# Patient Record
Sex: FEMALE | Race: WHITE | NOT HISPANIC OR LATINO | Employment: FULL TIME | URBAN - METROPOLITAN AREA
[De-identification: names, ages, dates, MRNs, and addresses within clinical notes are randomized per-mention and may not be internally consistent; named-entity substitution may affect disease eponyms.]

---

## 2017-02-28 ENCOUNTER — HOSPITAL ENCOUNTER (EMERGENCY)
Facility: CLINIC | Age: 22
Discharge: HOME | End: 2017-02-28
Attending: EMERGENCY MEDICINE | Admitting: EMERGENCY MEDICINE
Payer: COMMERCIAL

## 2017-02-28 VITALS
OXYGEN SATURATION: 98 % | HEIGHT: 64 IN | RESPIRATION RATE: 18 BRPM | SYSTOLIC BLOOD PRESSURE: 121 MMHG | TEMPERATURE: 98.1 F | HEART RATE: 71 BPM | DIASTOLIC BLOOD PRESSURE: 77 MMHG

## 2017-02-28 DIAGNOSIS — S46.812A STRAIN OF TRAPEZIUS MUSCLE, LEFT, INITIAL ENCOUNTER: ICD-10-CM
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
## 2017-02-28 PROCEDURE — 99283 EMERGENCY DEPT VISIT LOW MDM: CPT | Mod: Z6 | Performed by: EMERGENCY MEDICINE

## 2017-02-28 PROCEDURE — 99283 EMERGENCY DEPT VISIT LOW MDM: CPT | Performed by: EMERGENCY MEDICINE

## 2017-02-28 PROCEDURE — 25000132 ZZH RX MED GY IP 250 OP 250 PS 637: Performed by: EMERGENCY MEDICINE

## 2017-02-28 RX ORDER — DESOGESTREL AND ETHINYL ESTRADIOL 0.15-0.03
1 KIT ORAL DAILY
COMMUNITY
End: 2023-08-06

## 2017-02-28 RX ORDER — ACETAMINOPHEN 325 MG/1
975 TABLET ORAL EVERY 4 HOURS PRN
Status: DISCONTINUED | OUTPATIENT
Start: 2017-02-28 | End: 2017-02-28 | Stop reason: HOSPADM

## 2017-02-28 RX ORDER — NAPROXEN 500 MG/1
500 TABLET ORAL 2 TIMES DAILY WITH MEALS
Qty: 14 TABLET | Refills: 0 | Status: SHIPPED | OUTPATIENT
Start: 2017-02-28 | End: 2017-03-07

## 2017-02-28 RX ORDER — METHOCARBAMOL 500 MG/1
1000 TABLET, FILM COATED ORAL 3 TIMES DAILY
Qty: 30 TABLET | Refills: 0 | Status: SHIPPED | OUTPATIENT
Start: 2017-02-28 | End: 2017-03-05

## 2017-02-28 RX ADMIN — ACETAMINOPHEN 975 MG: 325 TABLET, FILM COATED ORAL at 19:44

## 2017-02-28 NOTE — ED AVS SNAPSHOT
[facility], Emergency Department    [address] [address]    Phone:  [phone]                                       Anastasia Yo   MRN: [id number]    Department:  [facility], Emergency Department   Date of Visit:  2/28/2017           Patient Information     Date Of Birth          1995        Your diagnoses for this visit were:     Strain of trapezius muscle, left, initial encounter        You were seen by Johan Gee MD.        Discharge Instructions       Wear sling for 2 days and then start moving left arm around 3 times daily to increase range of motion.    May use heat for comfort    Please make an appointment to follow up with [facility] (phone: [phone]) or our Sports Medicine Clinic (phone: [phone]) in 6-7 days unless symptoms completely resolve.    Discharge References/Attachments     MUSCLE STRAIN, EXTREMITY (ENGLISH)      24 Hour Appointment Hotline       To make an appointment at any [facility] clinic, call [phone] ([phone]). If you don't have a family doctor or clinic, we will help you find one. [facility] clinics are conveniently located to serve the needs of you and your family.          ED Discharge Orders     ARM SLING L                    Review of your medicines      START taking        Dose / Directions Last dose taken    methocarbamol 500 MG tablet   Commonly known as:  ROBAXIN   Dose:  1000 mg   Quantity:  30 tablet        Take 2 tablets (1,000 mg) by mouth 3 times daily for 5 days   Refills:  0        naproxen 500 MG tablet   Commonly known as:  NAPROSYN   Dose:  500 mg   Quantity:  14 tablet        Take 1 tablet (500 mg) by mouth 2 times daily (with meals) for 7 days   Refills:  0          Our records show that you are taking the medicines listed below. If these are incorrect, please call your family doctor or clinic.        Dose / Directions Last dose taken    desogestrel-ethinyl estradiol 0.15-30 MG-MCG per  "tablet   Commonly known as:  APRI   Dose:  1 tablet        Take 1 tablet by mouth daily   Refills:  0        SERTRALINE HCL PO   Dose:  100 mg        Take 100 mg by mouth daily   Refills:  0        TRAZODONE HCL PO   Dose:  50 mg        Take 50 mg by mouth nightly as needed for sleep   Refills:  0                Prescriptions were sent or printed at these locations (2 Prescriptions)                   Other Prescriptions                Printed at Department/Unit printer (2 of 2)         naproxen (NAPROSYN) 500 MG tablet               methocarbamol (ROBAXIN) 500 MG tablet                Orders Needing Specimen Collection     None      Pending Results     No orders found from 2017 to 3/1/2017.            Pending Culture Results     No orders found from 2017 to 3/1/2017.            Thank you for choosing [facility]       Thank you for choosing [facility] for your care. Our goal is always to provide you with excellent care. Hearing back from our patients is one way we can continue to improve our services. Please take a few minutes to complete the written survey that you may receive in the mail after you visit with us. Thank you!        [employer] Information     [employer] lets you send messages to your doctor, view your test results, renew your prescriptions, schedule appointments and more. To sign up, go to [email or web address] . Click on \"Log in\" on the left side of the screen, which will take you to the Welcome page. Then click on \"Sign up Now\" on the right side of the page.     You will be asked to enter the access code listed below, as well as some personal information. Please follow the directions to create your username and password.     Your access code is: [email or web address]  Expires: 2017  8:26 PM     Your access code will  in 90 days. If you need help or a new code, please call your [facility] clinic or [phone].        Care EveryWhere ID     This is your Care EveryWhere ID. This could be used by " other organizations to access your [facility] medical records  [id number]        After Visit Summary       This is your record. Keep this with you and show to your community pharmacist(s) and doctor(s) at your next visit.

## 2017-02-28 NOTE — ED AVS SNAPSHOT
[facility], Emergency Department    [address] [place] [address]    Phone:  [phone]                                       Anastasia Yo   MRN: [id number]    Department:  [facility], Emergency Department   Date of Visit:  2/28/2017           After Visit Summary Signature Page     I have received my discharge instructions, and my questions have been answered. I have discussed any challenges I see with this plan with the nurse or doctor.    ..........................................................................................................................................  Patient/Patient Representative Signature      ..........................................................................................................................................  Patient Representative Print Name and Relationship to Patient    ..................................................               ................................................  Date                                            Time    ..........................................................................................................................................  Reviewed by Signature/Title    ...................................................              ..............................................  Date                                                            Time

## 2017-03-01 ENCOUNTER — OFFICE VISIT (OUTPATIENT)
Dept: ORTHOPEDICS | Facility: CLINIC | Age: 22
End: 2017-03-01

## 2017-03-01 VITALS — BODY MASS INDEX: 24.75 KG/M2 | WEIGHT: 145 LBS | HEIGHT: 64 IN

## 2017-03-01 DIAGNOSIS — M79.18 MYOFASCIAL PAIN ON LEFT SIDE: Primary | ICD-10-CM

## 2017-03-01 NOTE — ED PROVIDER NOTES
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  Patient states she was exercising this evening when she felt a pop in her left shoulder blade area.  She c/o pain in the mid left trapezius muscle.  She denies previous orthopedic injury.    I have reviewed the Medications, Allergies, Past Medical and Surgical History, and Social History in the [employer] system.  Past Medical History   Diagnosis Date     Anxiety      No past surgical history on file.    Previous Medications    DESOGESTREL-ETHINYL ESTRADIOL (APRI) 0.15-30 MG-MCG PER TABLET    Take 1 tablet by mouth daily    SERTRALINE HCL PO    Take 100 mg by mouth daily    TRAZODONE HCL PO    Take 50 mg by mouth nightly as needed for sleep      Social History     Social History     Marital status: Single     Spouse name: N/A     Number of children: N/A     Years of education: N/A     Occupational History     Not on file.     Social History Main Topics     Smoking status: Not on file     Smokeless tobacco: Not on file     Alcohol use Not on file     Drug use: Not on file     Sexual activity: Not on file     Other Topics Concern     Not on file     Social History Narrative     No narrative on file        Allergies   Allergen Reactions     No Clinical Screening - See Comments      Suparax       Review of Systems    ROS: 10 point ROS neg other than the symptoms noted above in the HPI.    Physical Exam   BP: 121/77  Pulse: 71  Temp: 98.1  F (36.7  C)  Resp: 18  Height: 162.6 cm (5' 4\")  SpO2: 98 %  Physical Exam   Constitutional: She is oriented to person, place, and time. No distress.   HENT:   Head: Atraumatic.   Eyes: EOM are normal. Pupils are equal, round, and reactive to light.   Neck: Neck supple.   nontender in the midline   Musculoskeletal:   Patient's left " shoulder has no bony tenderness.  The glenohumeral joint has good range of motion without tenderness.  There is no clavicle tenderness or AC tenderness.  Tenderness exists in the trapezius muscle without midline spinal tenderness.    Left upper extremity distal CMS intact.   Neurological: She is alert and oriented to person, place, and time.   grossly intact and symmetric   Skin: Skin is warm. She is not diaphoretic.   Psychiatric: She has a normal mood and affect.       ED Course     ED Course     Procedures            Assessments & Plan (with Medical Decision Making)     I have reviewed the nursing notes.    At this time we will treat the patient conservatively with a sling and medications including those below.  X-ray deferred to outpatient follow-up.  Patient in agreement with this.    I have reviewed the findings, diagnosis, plan and need for follow up with the patient.    New Prescriptions    METHOCARBAMOL (ROBAXIN) 500 MG TABLET    Take 2 tablets (1,000 mg) by mouth 3 times daily for 5 days    NAPROXEN (NAPROSYN) 500 MG TABLET    Take 1 tablet (500 mg) by mouth 2 times daily (with meals) for 7 days       Final diagnoses:   Strain of trapezius muscle, left, initial encounter     Wear sling for 2 days and then start moving left arm around 3 times daily to increase range of motion.    May use heat for comfort    Please make an appointment to follow up with [facility] (phone: [phone]) or our Sports Medicine Clinic (phone: [phone]) in 6-7 days unless symptoms completely resolve.    2/28/2017   [facility], EMERGENCY DEPARTMENT     Johan Gee MD  02/28/17 2033

## 2017-03-01 NOTE — LETTER
"  3/1/2017      RE: Anastasia Yo  [id number] [facility] [address]        Subjective:   Anastasia Yo is a 21 year old female who complains of left shoulder pain. She states that for 2-3 weeks her left posterior shoulder region and musculature has felt sore. She saw her chiropractor but didn't feel any relief. It has gotten worse over the last 2 days. Last night she was doing low impact physical activity when she heard an audible pop in the shoulder and felt excruciating pain. She presented to the ED and was evaluated and given a sling. She is here today for follow up. No worsening of her symptoms. She denies any history of shoulder trauma or shoulder injury in the past. Denies history of whiplash. Denies any neck injury or radicular symptoms. She denies any concurrent illness, URI, new medications or change in activity.    Background:   Date of injury: None   Duration of symptoms: 3 weeks  Mechanism of Injury: Insidious Onset; Unknown   Aggravating factors: Laying down, rolling over in bed, movement of the right arm, heavy lifting  Relieving Factors: Robaxin & Naprosyn   Prior Evaluation: Prior Physician Evalutation: ED    PAST MEDICAL, SOCIAL, SURGICAL AND FAMILY HISTORY: She  has a past medical history of Anxiety.  She  has no past surgical history on file.  Her family history is not on file.  She     ALLERGIES: She is allergic to no clinical screening - see comments.    CURRENT MEDICATIONS: She has a current medication list which includes the following prescription(s): sertraline hcl, trazodone hcl, desogestrel-ethinyl estradiol, naproxen, and methocarbamol.     REVIEW OF SYSTEMS: 12 point review of systems is negative except as noted above.     Exam:   Ht 5' 4\" (1.626 m)  Wt 145 lb (65.8 kg)  BMI 24.89 kg/m2      CONSTITUTIONIAL: healthy, alert and no distress  HEAD: Normocephalic. No masses, lesions, tenderness or abnormalities  SKIN: no suspicious lesions or rashes  GAIT: normal  NEUROLOGIC: Non-focal, " Normal muscle tone and strength, reflexes normal, sensation grossly normal.  PSYCHIATRIC: affect normal/bright and mentation appears normal.    MUSCULOSKELETAL:   CERVICAL SPINE: nontender over the cervical spine; ROM of demonstrates full flexion, full extension, full extension rotation to the right, full extension rotation to the left, (Negative) Spurling's maneuver; motor examination demonstrates symmetric motor strength (5/5) bilaterally. Sensation to light touch is intact in the bilateral upper extremities. DTRs trace and symmetric in the bilateral upper extremities.    LEFT SHOULDER: Clavicle and AC joint nontender. FROM. Negative impingement signs. MMT of RC muscles intact and nonpainful. No laxity.    SHOULDER GIRDLE: +trigger points in the left mid trapezius and rhomboid. No scapular winging or snapping. Scapular bursa is nontender.         Assessment/Plan:   (M79.1) Myofascial pain on left side  (primary encounter diagnosis)  Comment: Reassured. May come out of sling. Discussed  mg po tid x 1 week. Consider PT if needed if not resolving quickly.  Superficial moist heat can be used. All questions answered.    Thank you for allowing me to participate in her care. Please feel free to contact me if I can provide any further information.             Bill Fofana MD

## 2017-03-01 NOTE — DISCHARGE INSTRUCTIONS
Wear sling for 2 days and then start moving left arm around 3 times daily to increase range of motion.    May use heat for comfort    Please make an appointment to follow up with [facility] (phone: [phone]) or our Sports Medicine Clinic (phone: [phone]) in 6-7 days unless symptoms completely resolve.

## 2017-03-01 NOTE — LETTER
Date:March 2, 2017      Patient was self referred, no letter generated. Do not send.        [facility] Health Information

## 2017-03-01 NOTE — ED NOTES
Left shoulder pain x3 weeks. States she does a lot of computer use and hunching over reading books  This evening she was exercising and heard a pop, and pain worsened, shooting down her arm   Denies numbness or tingling  Using aleve and hot packs with relief

## 2017-03-01 NOTE — PROGRESS NOTES
" Subjective:   Anastasia Yo is a 21 year old female who complains of left shoulder pain. She states that for 2-3 weeks her left posterior shoulder region and musculature has felt sore. She saw her chiropractor but didn't feel any relief. It has gotten worse over the last 2 days. Last night she was doing low impact physical activity when she heard an audible pop in the shoulder and felt excruciating pain. She presented to the ED and was evaluated and given a sling. She is here today for follow up. No worsening of her symptoms. She denies any history of shoulder trauma or shoulder injury in the past. Denies history of whiplash. Denies any neck injury or radicular symptoms. She denies any concurrent illness, URI, new medications or change in activity.    Background:   Date of injury: None   Duration of symptoms: 3 weeks  Mechanism of Injury: Insidious Onset; Unknown   Aggravating factors: Laying down, rolling over in bed, movement of the right arm, heavy lifting  Relieving Factors: Robaxin & Naprosyn   Prior Evaluation: Prior Physician Evalutation: ED    PAST MEDICAL, SOCIAL, SURGICAL AND FAMILY HISTORY: She  has a past medical history of Anxiety.  She  has no past surgical history on file.  Her family history is not on file.  She     ALLERGIES: She is allergic to no clinical screening - see comments.    CURRENT MEDICATIONS: She has a current medication list which includes the following prescription(s): sertraline hcl, trazodone hcl, desogestrel-ethinyl estradiol, naproxen, and methocarbamol.     REVIEW OF SYSTEMS: 12 point review of systems is negative except as noted above.     Exam:   Ht 5' 4\" (1.626 m)  Wt 145 lb (65.8 kg)  BMI 24.89 kg/m2      CONSTITUTIONIAL: healthy, alert and no distress  HEAD: Normocephalic. No masses, lesions, tenderness or abnormalities  SKIN: no suspicious lesions or rashes  GAIT: normal  NEUROLOGIC: Non-focal, Normal muscle tone and strength, reflexes normal, sensation grossly " normal.  PSYCHIATRIC: affect normal/bright and mentation appears normal.    MUSCULOSKELETAL:   CERVICAL SPINE: nontender over the cervical spine; ROM of demonstrates full flexion, full extension, full extension rotation to the right, full extension rotation to the left, (Negative) Spurling's maneuver; motor examination demonstrates symmetric motor strength (5/5) bilaterally. Sensation to light touch is intact in the bilateral upper extremities. DTRs trace and symmetric in the bilateral upper extremities.    LEFT SHOULDER: Clavicle and AC joint nontender. FROM. Negative impingement signs. MMT of RC muscles intact and nonpainful. No laxity.    SHOULDER GIRDLE: +trigger points in the left mid trapezius and rhomboid. No scapular winging or snapping. Scapular bursa is nontender.         Assessment/Plan:   (M79.1) Myofascial pain on left side  (primary encounter diagnosis)  Comment: Reassured. May come out of sling. Discussed  mg po tid x 1 week. Consider PT if needed if not resolving quickly.  Superficial moist heat can be used. All questions answered.    Thank you for allowing me to participate in her care. Please feel free to contact me if I can provide any further information.

## 2017-03-01 NOTE — MR AVS SNAPSHOT
"              After Visit Summary   3/1/2017    Anastasia Yo    MRN: [id number]           Patient Information     Date Of Birth          1995        Visit Information        Provider Department      3/1/2017 1:30 PM Bill Fofana MD [facility]        Today's Diagnoses     Myofascial pain on left side    -  1       Follow-ups after your visit        Who to contact     Please call your clinic at [phone] to:    Ask questions about your health    Make or cancel appointments    Discuss your medicines    Learn about your test results    Speak to your doctor   If you have compliments or concerns about an experience at your clinic, or if you wish to file a complaint, please contact [facility] Patient Relations at [phone] or email us at [email or web address]         Additional Information About Your Visit        MyChart Information     [employer] is an electronic gateway that provides easy, online access to your medical records. With [employer], you can request a clinic appointment, read your test results, renew a prescription or communicate with your care team.     To sign up for [employer] visit the website at [email or web address]   You will be asked to enter the access code listed below, as well as some personal information. Please follow the directions to create your username and password.     Your access code is: [email or web address]  Expires: 2017  8:26 PM     Your access code will  in 90 days. If you need help or a new code, please contact your [facility] or call [phone] for assistance.        Care EveryWhere ID     This is your Care EveryWhere ID. This could be used by other organizations to access your [facility] medical records  [id number]        Your Vitals Were     Height BMI (Body Mass Index)                5' 4\" (1.626 m) 24.89 kg/m2           Blood Pressure from Last 3 Encounters:   17 121/77 "   11/13/16 93/59    Weight from Last 3 Encounters:   03/01/17 145 lb (65.8 kg) (77 %)*     * Growth percentiles are based on CDC 2-20 Years data.              Today, you had the following     No orders found for display       Primary Care Provider    [facility] Other Clinic                Thank you!     Thank you for choosing [facility]  for your care. Our goal is always to provide you with excellent care. Hearing back from our patients is one way we can continue to improve our services. Please take a few minutes to complete the written survey that you may receive in the mail after your visit with us. Thank you!             Your Updated Medication List - Protect others around you: Learn how to safely use, store and throw away your medicines at www.disposemymeds.org.          This list is accurate as of: 3/1/17  2:51 PM.  Always use your most recent med list.                   Brand Name Dispense Instructions for use    desogestrel-ethinyl estradiol 0.15-30 MG-MCG per tablet    APRI     Take 1 tablet by mouth daily       methocarbamol 500 MG tablet    ROBAXIN    30 tablet    Take 2 tablets (1,000 mg) by mouth 3 times daily for 5 days       naproxen 500 MG tablet    NAPROSYN    14 tablet    Take 1 tablet (500 mg) by mouth 2 times daily (with meals) for 7 days       SERTRALINE HCL PO      Take 100 mg by mouth daily       TRAZODONE HCL PO      Take 50 mg by mouth nightly as needed for sleep

## 2019-09-10 ENCOUNTER — HOSPITAL ENCOUNTER (EMERGENCY)
Facility: CLINIC | Age: 24
Discharge: HOME | End: 2019-09-10
Attending: EMERGENCY MEDICINE | Admitting: EMERGENCY MEDICINE
Payer: COMMERCIAL

## 2019-09-10 VITALS
SYSTOLIC BLOOD PRESSURE: 135 MMHG | WEIGHT: 198.8 LBS | HEIGHT: 64 IN | TEMPERATURE: 99 F | OXYGEN SATURATION: 97 % | BODY MASS INDEX: 33.94 KG/M2 | RESPIRATION RATE: 12 BRPM | DIASTOLIC BLOOD PRESSURE: 58 MMHG | HEART RATE: 18 BPM

## 2019-09-10 DIAGNOSIS — J02.9 ACUTE SORE THROAT: ICD-10-CM

## 2019-09-10 LAB
DEPRECATED S PYO AG THROAT QL EIA: NORMAL
HETEROPH AB SER QL: NEGATIVE
SPECIMEN SOURCE: NORMAL

## 2019-09-10 PROCEDURE — 87081 CULTURE SCREEN ONLY: CPT | Performed by: EMERGENCY MEDICINE

## 2019-09-10 PROCEDURE — 99284 EMERGENCY DEPT VISIT MOD MDM: CPT | Mod: Z6 | Performed by: EMERGENCY MEDICINE

## 2019-09-10 PROCEDURE — 25000131 ZZH RX MED GY IP 250 OP 636 PS 637: Performed by: EMERGENCY MEDICINE

## 2019-09-10 PROCEDURE — 86308 HETEROPHILE ANTIBODY SCREEN: CPT | Performed by: EMERGENCY MEDICINE

## 2019-09-10 PROCEDURE — 87880 STREP A ASSAY W/OPTIC: CPT | Performed by: EMERGENCY MEDICINE

## 2019-09-10 PROCEDURE — 99283 EMERGENCY DEPT VISIT LOW MDM: CPT | Performed by: EMERGENCY MEDICINE

## 2019-09-10 PROCEDURE — 25000132 ZZH RX MED GY IP 250 OP 250 PS 637: Performed by: EMERGENCY MEDICINE

## 2019-09-10 RX ORDER — DEXAMETHASONE 4 MG/1
4 TABLET ORAL ONCE
Status: COMPLETED | OUTPATIENT
Start: 2019-09-10 | End: 2019-09-10

## 2019-09-10 RX ORDER — IBUPROFEN 600 MG/1
600 TABLET, FILM COATED ORAL ONCE
Status: COMPLETED | OUTPATIENT
Start: 2019-09-10 | End: 2019-09-10

## 2019-09-10 RX ADMIN — DEXAMETHASONE 4 MG: 4 TABLET ORAL at 18:47

## 2019-09-10 RX ADMIN — IBUPROFEN 600 MG: 600 TABLET ORAL at 18:47

## 2019-09-10 ASSESSMENT — ENCOUNTER SYMPTOMS
HEADACHES: 0
SORE THROAT: 1
FEVER: 0
SHORTNESS OF BREATH: 0
NAUSEA: 0

## 2019-09-10 ASSESSMENT — MIFFLIN-ST. JEOR: SCORE: 1651.75

## 2019-09-10 NOTE — ED TRIAGE NOTES
Pt has been treated for strep 2 1/2 weeks ago. Pt reports her throat is a lot more tender today with lymph node swelling.

## 2019-09-10 NOTE — ED PROVIDER NOTES
"  History     Chief Complaint   Patient presents with     Oral Swelling     Sore throat/swelling/strep 2 1/2 weeks ago     HPI  Anastasia Yo is a 23 year old female who presents with a sore throat.  She was diagnosed with strep 2 weeks ago and finished her amoxicillin 1 week ago.  She is feeling better however yesterday developed worsening throat pain.  She looked in her mouth today and thought she saw exudates on her tonsils.  She denies fever and has a mild cough.  She denies any dental infections, broken teeth, dental caries.  She has no difficulty swallowing or problem managing her secretions, just has some pain with swallowing.  She has not taken anything at home for her symptoms aside from some Chloraseptic Spray.  Her sister is also ill.    I have reviewed the Medications, Allergies, Past Medical and Surgical History, and Social History in the [employer] system.    Review of Systems   Constitutional: Negative for fever.   HENT: Positive for sore throat. Negative for congestion.    Respiratory: Negative for shortness of breath.    Cardiovascular: Negative for chest pain.   Gastrointestinal: Negative for nausea.   Skin: Negative for rash.   Neurological: Negative for headaches.       Physical Exam   BP: 135/58  Heart Rate: 93  Temp: 99  F (37.2  C)  Resp: 12  Height: 162.6 cm (5' 4\")  Weight: 90.2 kg (198 lb 12.8 oz)  SpO2: 96 %      Physical Exam   Constitutional: She is oriented to person, place, and time. She appears well-developed and well-nourished. No distress.   HENT:   Head: Normocephalic and atraumatic.   Mild erythema of posterior oropharynx.  No exudates on tonsils.  Good dentition.   Eyes: Conjunctivae are normal.   Neck: Normal range of motion.   Cardiovascular: Normal rate and regular rhythm.   Pulmonary/Chest: Effort normal and breath sounds normal.   Abdominal: Soft. There is no tenderness.   Musculoskeletal: Normal range of motion.   Neurological: She is alert and oriented to person, place, and time. "   Skin: Skin is warm and dry. No rash noted.       ED Course        Procedures             Critical Care time:  none             Labs Ordered and Resulted from Time of ED Arrival Up to the Time of Departure from the ED - No data to display         Assessments & Plan (with Medical Decision Making)   Ms. Yo is a 23-year-old woman with no significant past medical history who presents with throat pain since yesterday.  She is recently treated for strep.  Strep swab ordered in triage was negative.  Symptoms most consistent with viral pharyngitis however due to recurrent nature of symptoms I sent a Monospot.  She was treated with small dose of Decadron and ibuprofen.  Supportive care recommendations were given as well as instructions for return for new or worsening symptoms.  Patient expressed understanding.    Anastasia Dean MD on 9/10/2019 at 10:55 PM     I have reviewed the nursing notes.    I have reviewed the findings, diagnosis, plan and need for follow up with the patient.    New Prescriptions    No medications on file       Final diagnoses:   None       9/10/2019   [facility], [place], EMERGENCY DEPARTMENT     Anastasia Dean MD  09/10/19 0934

## 2019-09-10 NOTE — DISCHARGE INSTRUCTIONS
Tylenol 650 mg every 6 hours as needed for pain.  Do not take more than 3250 mg per day   Use warm tea and honey, salt water gargles, topical anesthetics like chloraseptic for pain  Follow up on mono screen tomorrow.  If it is positive you should follow up with your primary doctor in 5-7 days and avoid contact sports.  Return to the emergency department for worsening pain, vomiting, difficulty breathing, drooling/inability to swallow, or if you have any new or changing symptoms/concerns.

## 2019-09-10 NOTE — ED AVS SNAPSHOT
[facility], [place], Emergency Department  [address] [place] [id number]  Phone:  [phone]                                    Anastasia Yo   MRN: [id number]    Department:  [facility], Emergency Department   Date of Visit:  9/10/2019           After Visit Summary Signature Page    I have received my discharge instructions, and my questions have been answered. I have discussed any challenges I see with this plan with the nurse or doctor.    ..........................................................................................................................................  Patient/Patient Representative Signature      ..........................................................................................................................................  Patient Representative Print Name and Relationship to Patient    ..................................................               ................................................  Date                                   Time    ..........................................................................................................................................  Reviewed by Signature/Title    ...................................................              ..............................................  Date                                               Time          22EPIC Rev 08/18

## 2019-09-11 NOTE — RESULT ENCOUNTER NOTE
Preliminary Beta strep group A r/o culture is PENDING and/or NEGATIVE at this time.   No changes in treatment per [place] Strep protocol.

## 2019-09-12 LAB
BACTERIA SPEC CULT: NORMAL
SPECIMEN SOURCE: NORMAL

## 2019-09-12 NOTE — RESULT ENCOUNTER NOTE
Final Beta strep group A r/o culture is NEGATIVE for Group A streptococcus.    No treatment or change in treatment per [place] Strep protocol.

## 2019-10-16 ENCOUNTER — TELEPHONE (OUTPATIENT)
Dept: PSYCHIATRY | Facility: CLINIC | Age: 24
End: 2019-10-16

## 2019-10-16 NOTE — TELEPHONE ENCOUNTER
PSYCHIATRY CLINIC PHONE INTAKE     SERVICES REQUESTED / INTERESTED IN          Med Management    Presenting Problem and Brief History                              What would you like to be seen for? (brief description):  Pt was recommended by her therapist to see a psychiatrist. She's taken medications in the past, and is currently taking buproprion 150mg and sertraline 100mg. She's noticed that her depression has gotten more severe over the last few months. She's fatigued and has a lost of interest in things she enjoyed. She also has thoughts of hopelessness and low self-image. Sleep is getting better, but there have been a couple of nights where she won't go to bed, or go to bed super late with maybe 2 hours of sleep. There are times when she's having racing thoughts that keep her awake, and other times when she wants to go to sleep, but worried that she will miss something if she does. She's been having bed-wetting problems and she went to the doctor about the issue, but she's concerned it may be caused by her mental health. The bed-wetting has been going on for over a year now. She also has history of eating disorder.    Have you received a mental health diagnosis? No   Which one (s): NA  Is there any history of developmental delay?  No   Are you currently seeing a mental health provider?  Yes            Who / month last seen:  Mary Menezes, therapist at the [facility].   Do you have mental health records elsewhere?  Yes  Will you sign a release so we can obtain them?  Yes    Have you ever been hospitalized for psychiatric reasons?  No  Describe:  NA    Do you have current thoughts of self-harm?  No    Do you currently have thoughts of harming others?  No       Substance Use History     Do you have any history of alcohol / illicit drug use?  No  Describe: However, her mother's side of the family has history of alcoholism and she has had issues with alcohol in the past.   Have you ever received treatment  for this?  No    Describe:  NA     Social History     Does the patient have a guardian?  No    Name / number: NA  Have you had an ACT team in last 12 months?  No  Describe: NA   Do you have any current or past legal issues?  No  Describe: NA   OK to leave a detailed voicemail?  Yes    Medical/ Surgical History                                 There is no problem list on file for this patient.         Medications             Current Outpatient Medications   Medication Sig Dispense Refill     desogestrel-ethinyl estradiol (APRI) 0.15-30 MG-MCG per tablet Take 1 tablet by mouth daily       SERTRALINE HCL PO Take 100 mg by mouth daily       TRAZODONE HCL PO Take 50 mg by mouth nightly as needed for sleep           DISPOSITION      10/16/19 Intake completed. Adding to resident/NP [facility].  Mckenzie Rodgers,

## 2021-04-13 ENCOUNTER — HOSPITAL ENCOUNTER (EMERGENCY)
Facility: CLINIC | Age: 26
Discharge: HOME | End: 2021-04-13
Attending: EMERGENCY MEDICINE | Admitting: EMERGENCY MEDICINE
Payer: COMMERCIAL

## 2021-04-13 VITALS
SYSTOLIC BLOOD PRESSURE: 134 MMHG | TEMPERATURE: 97.1 F | DIASTOLIC BLOOD PRESSURE: 85 MMHG | BODY MASS INDEX: 29.18 KG/M2 | OXYGEN SATURATION: 99 % | HEART RATE: 80 BPM | RESPIRATION RATE: 16 BRPM | WEIGHT: 170 LBS

## 2021-04-13 DIAGNOSIS — T74.21XA SEXUAL ASSAULT OF ADULT, INITIAL ENCOUNTER: ICD-10-CM

## 2021-04-13 LAB
ALBUMIN UR-MCNC: NEGATIVE MG/DL
APPEARANCE UR: CLEAR
BACTERIA #/AREA URNS HPF: ABNORMAL /HPF
BILIRUB UR QL STRIP: NEGATIVE
COLOR UR AUTO: ABNORMAL
GLUCOSE UR STRIP-MCNC: NEGATIVE MG/DL
HCG UR QL: NEGATIVE
HGB UR QL STRIP: NEGATIVE
KETONES UR STRIP-MCNC: NEGATIVE MG/DL
LEUKOCYTE ESTERASE UR QL STRIP: NEGATIVE
NITRATE UR QL: NEGATIVE
PH UR STRIP: 6.5 PH (ref 5–7)
RBC #/AREA URNS AUTO: <1 /HPF (ref 0–2)
SOURCE: ABNORMAL
SP GR UR STRIP: 1.02 (ref 1–1.03)
SPECIMEN SOURCE: NORMAL
SQUAMOUS #/AREA URNS AUTO: <1 /HPF (ref 0–1)
UROBILINOGEN UR STRIP-MCNC: NORMAL MG/DL (ref 0–2)
WBC #/AREA URNS AUTO: <1 /HPF (ref 0–5)
WET PREP SPEC: NORMAL

## 2021-04-13 PROCEDURE — 87491 CHLMYD TRACH DNA AMP PROBE: CPT | Performed by: EMERGENCY MEDICINE

## 2021-04-13 PROCEDURE — 87591 N.GONORRHOEAE DNA AMP PROB: CPT | Performed by: EMERGENCY MEDICINE

## 2021-04-13 PROCEDURE — 87210 SMEAR WET MOUNT SALINE/INK: CPT | Performed by: EMERGENCY MEDICINE

## 2021-04-13 PROCEDURE — 81001 URINALYSIS AUTO W/SCOPE: CPT | Performed by: EMERGENCY MEDICINE

## 2021-04-13 PROCEDURE — 96372 THER/PROPH/DIAG INJ SC/IM: CPT | Performed by: EMERGENCY MEDICINE

## 2021-04-13 PROCEDURE — 99284 EMERGENCY DEPT VISIT MOD MDM: CPT

## 2021-04-13 PROCEDURE — 99284 EMERGENCY DEPT VISIT MOD MDM: CPT | Performed by: EMERGENCY MEDICINE

## 2021-04-13 PROCEDURE — 250N000011 HC RX IP 250 OP 636: Performed by: EMERGENCY MEDICINE

## 2021-04-13 PROCEDURE — 250N000013 HC RX MED GY IP 250 OP 250 PS 637: Performed by: EMERGENCY MEDICINE

## 2021-04-13 PROCEDURE — 81025 URINE PREGNANCY TEST: CPT | Performed by: EMERGENCY MEDICINE

## 2021-04-13 PROCEDURE — 250N000009 HC RX 250: Performed by: EMERGENCY MEDICINE

## 2021-04-13 RX ORDER — CEFTRIAXONE SODIUM 1 G
500 VIAL (EA) INJECTION ONCE
Status: DISCONTINUED | OUTPATIENT
Start: 2021-04-13 | End: 2021-04-13

## 2021-04-13 RX ORDER — DOXYCYCLINE 100 MG/1
100 CAPSULE ORAL ONCE
Status: COMPLETED | OUTPATIENT
Start: 2021-04-13 | End: 2021-04-13

## 2021-04-13 RX ORDER — DESMOPRESSIN ACETATE 0.2 MG/1
0.2 TABLET ORAL
COMMUNITY
Start: 2021-04-05

## 2021-04-13 RX ORDER — BUPROPION HYDROCHLORIDE 150 MG/1
150 TABLET ORAL
COMMUNITY
Start: 2021-03-31 | End: 2023-08-06

## 2021-04-13 RX ADMIN — LIDOCAINE HYDROCHLORIDE 500 MG: 10 INJECTION, SOLUTION EPIDURAL; INFILTRATION; INTRACAUDAL; PERINEURAL at 19:57

## 2021-04-13 RX ADMIN — DOXYCYCLINE 100 MG: 100 CAPSULE ORAL at 19:40
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
## 2021-04-14 LAB
C TRACH DNA SPEC QL NAA+PROBE: NEGATIVE
N GONORRHOEA DNA SPEC QL NAA+PROBE: NEGATIVE
SPECIMEN SOURCE: NORMAL
SPECIMEN SOURCE: NORMAL
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
## 2021-04-14 NOTE — RESULT ENCOUNTER NOTE
Final result for both N. Gonorrhoeae PCR and Chlamydia Trachomatis PCR are NEGATIVE.  No treatment or change in treatment per [facility] ED Lab Result N. Gonorrhea AND/OR Chlamydia T. protocol.

## 2022-05-23 ENCOUNTER — OFFICE VISIT (OUTPATIENT)
Dept: URGENT CARE | Facility: URGENT CARE | Age: 27
End: 2022-05-23
Payer: COMMERCIAL

## 2022-05-23 VITALS
SYSTOLIC BLOOD PRESSURE: 120 MMHG | HEART RATE: 69 BPM | HEIGHT: 64 IN | WEIGHT: 175 LBS | TEMPERATURE: 98.7 F | RESPIRATION RATE: 15 BRPM | BODY MASS INDEX: 29.88 KG/M2 | DIASTOLIC BLOOD PRESSURE: 60 MMHG | OXYGEN SATURATION: 98 %

## 2022-05-23 DIAGNOSIS — H60.391 INFECTIVE OTITIS EXTERNA, RIGHT: Primary | ICD-10-CM

## 2022-05-23 PROCEDURE — 99203 OFFICE O/P NEW LOW 30 MIN: CPT | Performed by: PHYSICIAN ASSISTANT

## 2022-05-23 RX ORDER — HYDROXYZINE HYDROCHLORIDE 10 MG/1
10 TABLET, FILM COATED ORAL 3 TIMES DAILY PRN
COMMUNITY

## 2022-05-23 RX ORDER — CIPROFLOXACIN AND DEXAMETHASONE 3; 1 MG/ML; MG/ML
4 SUSPENSION/ DROPS AURICULAR (OTIC) 2 TIMES DAILY
Qty: 2.8 ML | Refills: 0 | Status: SHIPPED | OUTPATIENT
Start: 2022-05-23 | End: 2022-05-30

## 2022-05-23 NOTE — PROGRESS NOTES
Infective otitis externa, right  - ciprofloxacin-dexamethasone (CIPRODEX) 0.3-0.1 % otic suspension; Place 4 drops into the right ear 2 times daily for 7 days        External Ear Infection (Adult)    External otitis (also called  swimmer s ear ) is an infection in the ear canal. It's often caused by bacteria or fungus. It can occur a few days after water gets trapped in the ear canal (from swimming or bathing). It can also occur after cleaning too deeply in the ear canal with a cotton swab or other object. Sometimes, hair care products get into the ear canal and cause this problem.   Symptoms can include pain, fever, itching, redness, drainage, or swelling of the ear canal. Temporary hearing loss may also occur.   Home care    Don't try to clean the ear canal. This can push pus and bacteria deeper into the canal.    Use prescribed ear drops as directed. These help reduce swelling and fight the infection. If an ear wick was placed in the ear canal, apply drops right onto the end of the wick. The wick will draw the medicine into the ear canal even if it's swollen closed.    A cotton ball may be loosely placed in the outer ear to absorb any drainage.    You may use over-the-counter medicines to control pain as directed by the healthcare provider, unless another medicine was prescribed. Talk with your provider before using these medicines if you have chronic liver or kidney disease or ever had a stomach ulcer or digestive tract bleeding.    Don't allow water to get into your ear when bathing. Also don't swim until the infection has cleared.     Prevention    Keep your ears dry. This helps lower the risk of infection. Dry your ears with a towel or hair dryer after getting wet. Also, use ear plugs when swimming.    Don't stick any objects in the ear to remove wax.    Talk with your provider about using ear drops to prevent swimmer's ear in case you feel water trapped in your ear canal. You can get these drops over the  counter at most drugstores. They work by removing water from the ear canal.     Follow-up care  Follow up with your healthcare provider in 1 week, or as advised.   When to seek medical advice  Call your healthcare provider right away if any of these occur:     Ear pain becomes worse or doesn t improve after 3 days of treatment    Redness or swelling of the outer ear occurs or gets worse    Headache    Fever of 100.4 F (38 C) or higher, or as directed by your healthcare provider  Call 911  Call 911 or get immediate medical care if any of the following occur:     Seizure    Unusual drowsiness or confusion    Unusual painful or stiff neck     [employer] last reviewed this educational content on 8/1/2020 2000-2021 [employer]. All rights reserved. This information is not intended as a substitute for professional medical care. Always follow your healthcare professional's instructions.                 Troy Gonzales PA-C  [facility] URGENT CARE    Subjective   26 year old who presents to clinic today for the following health issues:    Urgent Care and Ear Problem     HPI     Patient visits today for right sided ear pain that began on Friday and has been gradually worsening. Patient originally though it was just a pimple in her ear. She denies vertigo, ear discharge, or ringing in the ear. She has some hearing loss. She states that she has been using her air pods a lot lately.     Review of Systems   Review of Systems   See HPI     Objective    Temp: 98.7  F (37.1  C) Temp src: Temporal BP: 120/60 Pulse: 69   Resp: 15 SpO2: 98 %       Physical Exam   Physical Exam  Constitutional:       General: She is not in acute distress.     Appearance: Normal appearance. She is normal weight. She is not ill-appearing, toxic-appearing or diaphoretic.   HENT:      Head: Normocephalic and atraumatic.      Right Ear: Hearing, tympanic membrane and external ear normal. Drainage, swelling and tenderness present.  There is no impacted cerumen.      Left Ear: Hearing, tympanic membrane, ear canal and external ear normal. No drainage, swelling or tenderness. There is no impacted cerumen.   Neurological:      General: No focal deficit present.      Mental Status: She is alert and oriented to person, place, and time. Mental status is at baseline.      Gait: Gait normal.   Psychiatric:         Mood and Affect: Mood normal.         Behavior: Behavior normal.         Thought Content: Thought content normal.         Judgment: Judgment normal.          No results found for this or any previous visit (from the past 24 hour(s)).

## 2023-08-06 ENCOUNTER — OFFICE VISIT (OUTPATIENT)
Dept: URGENT CARE | Facility: URGENT CARE | Age: 28
End: 2023-08-06
Payer: COMMERCIAL

## 2023-08-06 VITALS
HEIGHT: 64 IN | HEART RATE: 80 BPM | WEIGHT: 185 LBS | OXYGEN SATURATION: 97 % | SYSTOLIC BLOOD PRESSURE: 108 MMHG | BODY MASS INDEX: 31.58 KG/M2 | DIASTOLIC BLOOD PRESSURE: 68 MMHG | TEMPERATURE: 98.1 F | RESPIRATION RATE: 16 BRPM

## 2023-08-06 DIAGNOSIS — A69.20 ERYTHEMA MIGRANS (LYME DISEASE): Primary | ICD-10-CM

## 2023-08-06 PROCEDURE — 99203 OFFICE O/P NEW LOW 30 MIN: CPT | Performed by: INTERNAL MEDICINE

## 2023-08-06 RX ORDER — DOXYCYCLINE 100 MG/1
100 CAPSULE ORAL 2 TIMES DAILY
Qty: 20 CAPSULE | Refills: 0 | Status: SHIPPED | OUTPATIENT
Start: 2023-08-06 | End: 2023-08-16

## 2023-08-06 NOTE — PROGRESS NOTES
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  Will treat presumptively.  - doxycycline hyclate (VIBRAMYCIN) 100 MG capsule; Take 1 capsule (100 mg) by mouth 2 times daily for 10 days    David Hobbs MD  [facility] URGENT CARE [place]    Millie Oconnor is a 27 year old, presenting for the following health issues:  Urgent Care and lymes (Bite with bullseye rash on right thigh, noted it past week. )      HPI   Chief complaint of a red spot around a spot on the right posterior thigh that has come up over the past week.  She did take a trip to [facility] within the past month.  Now noting a bite on the right posterior thigh with an expanding [place] IRA of redness. She is active outdoors in addition to the [place] trip. Denies other joint pains, shortness of breath, change in exercise tolerance, numbness, weakness, hearing or vision problems, headache, neck pain, unusual fatigue.    Review of Systems   Constitutional, HEENT, cardiovascular, pulmonary, gi and gu systems are negative, except as otherwise noted.      Objective    /68   Pulse 80   Temp 98.1  F (36.7  C) (Temporal)   Resp 16   Ht 1.626 m (5' 4\")   Wt 83.9 kg (185 lb)   SpO2 97%   BMI 31.76 kg/m    Body mass index is 31.76 kg/m .  Physical Exam   GENERAL APPEARANCE: healthy, alert, and no distress  SKIN: raised pink papule on the posterior thigh with annular [place] IRA of pale erythema                       "